# Patient Record
Sex: FEMALE | Race: BLACK OR AFRICAN AMERICAN
[De-identification: names, ages, dates, MRNs, and addresses within clinical notes are randomized per-mention and may not be internally consistent; named-entity substitution may affect disease eponyms.]

---

## 2017-10-10 ENCOUNTER — HOSPITAL ENCOUNTER (OUTPATIENT)
Dept: HOSPITAL 62 - WI | Age: 57
End: 2017-10-10
Attending: INTERNAL MEDICINE
Payer: COMMERCIAL

## 2017-10-10 DIAGNOSIS — Z12.31: Primary | ICD-10-CM

## 2017-10-10 PROCEDURE — 77067 SCR MAMMO BI INCL CAD: CPT

## 2017-10-10 PROCEDURE — G0202 SCR MAMMO BI INCL CAD: HCPCS

## 2017-10-10 NOTE — WOMENS IMAGING REPORT
EXAM DESCRIPTION:  BILAT SCREENING MAMMO W/CAD



COMPLETED DATE/TIME:  10/10/2017 9:12 am



REASON FOR STUDY:  SCREENING MAMMO Z12.31  ENCNTR SCREEN MAMMOGRAM FOR MALIGNANT NEOPLASM OF SAWYER



COMPARISON:  None.



TECHNIQUE:  Standard craniocaudal and mediolateral oblique views of each breast recorded using digita
l acquisition.



LIMITATIONS:  None.



FINDINGS:  No masses, calcifications or architectural distortion. No areas of suspicion.

Read with the assistance of CAD.

.[facility] - R2 Cenova Version 1.3

.[facility] - R2 Cenova Version 1.3

.[facility] - R2 Cenova Version 2.4

[place] [place] Cenova [place] 2.[place]

.[facility] - R2  Version 9.2



IMPRESSION:  NORMAL MAMMOGRAM.  BIRADS 1.



BREAST DENSITY:  b. There are scattered areas of fibroglandular density.



BIRAD:  1 NEGATIVE



RECOMMENDATION:  ROUTINE SCREENING



COMMENT:  The patient has been notified of the results by letter per SA requirements. Additional no
tification policies are in place for contacting patient with suspicious or incomplete findings.

Quality ID #225: The American College of Radiology recommends an annual screening mammogram for women
 aged 40 years or over. This facility utilizes a reminder system to ensure that all patients receive 
reminder letters, and/or direct phone calls for appointments. This includes reminders for routine scr
eening mammograms, diagnostic mammograms, or other Breast Imaging Interventions when appropriate.  Th
is patient will be placed in the appropriate reminder system.

The American College of Radiology (ACR) has developed recommendations for screening MRI of the breast
s in certain patient populations, to be used in conjunction with mammography.  Breast MRI surveillanc
e may be appropriate for women with more than 20% lifetime risk of developing breast cancer  as deter
mined by genetic testing, significant family history of the disease, or history of mantle radiation f
or Hodgkins Disease.  ACR Practice Guidelines 2008.



TECHNICAL DOCUMENTATION:  FINDING NUMBER: (1)

ASSESSMENT: (1)

JOB ID:  [id number]

 2011 [employer]- All Rights Reserved

## 2019-04-02 ENCOUNTER — HOSPITAL ENCOUNTER (OUTPATIENT)
Dept: HOSPITAL 62 - WI | Age: 59
End: 2019-04-02
Attending: OBSTETRICS & GYNECOLOGY
Payer: COMMERCIAL

## 2019-04-02 DIAGNOSIS — Z12.31: Primary | ICD-10-CM

## 2019-04-02 PROCEDURE — 77067 SCR MAMMO BI INCL CAD: CPT

## 2019-04-02 NOTE — WOMENS IMAGING REPORT
EXAM DESCRIPTION:  BILAT SCREENING MAMMO W/CAD



COMPLETED DATE/TIME:  4/2/2019 11:52 am



REASON FOR STUDY:  Z12.31 ROUTINE BILATERAL SCREENING Z12.31  ENCNTR SCREEN MAMMOGRAM FOR MALIGNANT N
EOPLASM OF SAWYER



COMPARISON:  10/10/2017.



TECHNIQUE:  Standard craniocaudal and mediolateral oblique views of each breast recorded using [employer]
l acquisition.



LIMITATIONS:  None.



FINDINGS:  No masses, calcifications or architectural distortion. No areas of suspicion.

Read with the assistance of CAD.

.[facility] - R2 Cenova Version 1.3

.[facility] - R2 Cenova Version 2.1

.[facility] - R2 Cenova Version 2.4

[place] [place]enova [place] 2.[place]

.[facility] - R2  Version 9.2



IMPRESSION:  NORMAL MAMMOGRAM.  BIRADS 1.



BREAST DENSITY:  b. There are scattered areas of fibroglandular density.



BIRAD:  1 NEGATIVE



RECOMMENDATION:  ROUTINE SCREENING



COMMENT:  The patient has been notified of the results by letter per SA requirements. Additional no
tification policies are in place for contacting patient with suspicious or incomplete findings.

Quality ID #225: The American College of Radiology recommends an annual screening mammogram for women
 aged 40 years or over. This facility utilizes a reminder system to ensure that all patients receive 
reminder letters, and/or direct phone calls for appointments. This includes reminders for routine scr
eening mammograms, diagnostic mammograms, or other Breast Imaging Interventions when appropriate.  Th
is patient will be placed in the appropriate reminder system.

The American College of Radiology (ACR) has developed recommendations for screening MRI of the breast
s in certain patient populations, to be used in conjunction with mammography.  Breast MRI surveillanc
e may be appropriate for women with more than 20% lifetime risk of developing breast cancer  as deter
mined by genetic testing, significant family history of the disease, or history of mantle radiation f
or Hodgkins Disease.  ACR Practice Guidelines 2008.



TECHNICAL DOCUMENTATION:  FINDING NUMBER: (1)

ASSESSMENT: (1)

JOB ID:  [id number]

 2011 [employer]- All Rights Reserved



Reading location - IP/workstation name: [email or web address]

## 2019-10-04 ENCOUNTER — HOSPITAL ENCOUNTER (EMERGENCY)
Dept: HOSPITAL 62 - ER | Age: 59
Discharge: HOME | End: 2019-10-04
Payer: COMMERCIAL

## 2019-10-04 VITALS — DIASTOLIC BLOOD PRESSURE: 72 MMHG | SYSTOLIC BLOOD PRESSURE: 144 MMHG

## 2019-10-04 DIAGNOSIS — K21.9: Primary | ICD-10-CM

## 2019-10-04 DIAGNOSIS — R11.0: ICD-10-CM

## 2019-10-04 DIAGNOSIS — R25.2: ICD-10-CM

## 2019-10-04 DIAGNOSIS — M54.6: ICD-10-CM

## 2019-10-04 DIAGNOSIS — M54.2: ICD-10-CM

## 2019-10-04 DIAGNOSIS — Q39.6: ICD-10-CM

## 2019-10-04 DIAGNOSIS — M54.9: ICD-10-CM

## 2019-10-04 DIAGNOSIS — I10: ICD-10-CM

## 2019-10-04 DIAGNOSIS — Z79.899: ICD-10-CM

## 2019-10-04 LAB
ADD MANUAL DIFF: NO
ALBUMIN SERPL-MCNC: 4.3 G/DL (ref 3.5–5)
ALP SERPL-CCNC: 84 U/L (ref 38–126)
ANION GAP SERPL CALC-SCNC: 10 MMOL/L (ref 5–19)
APTT BLD: 31.5 SEC (ref 23.5–35.8)
AST SERPL-CCNC: 20 U/L (ref 14–36)
BASOPHILS # BLD AUTO: 0 10^3/UL (ref 0–0.2)
BASOPHILS NFR BLD AUTO: 0.7 % (ref 0–2)
BILIRUB DIRECT SERPL-MCNC: 0.1 MG/DL (ref 0–0.4)
BILIRUB SERPL-MCNC: 0.7 MG/DL (ref 0.2–1.3)
BUN SERPL-MCNC: 19 MG/DL (ref 7–20)
CALCIUM: 9.4 MG/DL (ref 8.4–10.2)
CHLORIDE SERPL-SCNC: 99 MMOL/L (ref 98–107)
CK MB SERPL-MCNC: 0.41 NG/ML (ref ?–4.55)
CK SERPL-CCNC: 57 U/L (ref 30–135)
CO2 SERPL-SCNC: 31 MMOL/L (ref 22–30)
EOSINOPHIL # BLD AUTO: 0.1 10^3/UL (ref 0–0.6)
EOSINOPHIL NFR BLD AUTO: 1.2 % (ref 0–6)
ERYTHROCYTE [DISTWIDTH] IN BLOOD BY AUTOMATED COUNT: 13.5 % (ref 11.5–14)
GLUCOSE SERPL-MCNC: 129 MG/DL (ref 75–110)
HCT VFR BLD CALC: 40.2 % (ref 36–47)
HGB BLD-MCNC: 13.7 G/DL (ref 12–15.5)
INR PPP: 1.01
LYMPHOCYTES # BLD AUTO: 2.6 10^3/UL (ref 0.5–4.7)
LYMPHOCYTES NFR BLD AUTO: 47.6 % (ref 13–45)
MCH RBC QN AUTO: 30.3 PG (ref 27–33.4)
MCHC RBC AUTO-ENTMCNC: 34 G/DL (ref 32–36)
MCV RBC AUTO: 89 FL (ref 80–97)
MONOCYTES # BLD AUTO: 0.4 10^3/UL (ref 0.1–1.4)
MONOCYTES NFR BLD AUTO: 7 % (ref 3–13)
NEUTROPHILS # BLD AUTO: 2.4 10^3/UL (ref 1.7–8.2)
NEUTS SEG NFR BLD AUTO: 43.5 % (ref 42–78)
NT PRO BNP: 39 PG/ML (ref 5–900)
PLATELET # BLD: 341 10^3/UL (ref 150–450)
POTASSIUM SERPL-SCNC: 2.9 MMOL/L (ref 3.6–5)
PROT SERPL-MCNC: 8.3 G/DL (ref 6.3–8.2)
PROTHROMBIN TIME: 13.3 SEC (ref 11.4–15.4)
RBC # BLD AUTO: 4.51 10^6/UL (ref 3.72–5.28)
TOTAL CELLS COUNTED % (AUTO): 100 %
TROPONIN I SERPL-MCNC: < 0.012 NG/ML
WBC # BLD AUTO: 5.4 10^3/UL (ref 4–10.5)

## 2019-10-04 PROCEDURE — 85025 COMPLETE CBC W/AUTO DIFF WBC: CPT

## 2019-10-04 PROCEDURE — 80053 COMPREHEN METABOLIC PANEL: CPT

## 2019-10-04 PROCEDURE — 85730 THROMBOPLASTIN TIME PARTIAL: CPT

## 2019-10-04 PROCEDURE — 84443 ASSAY THYROID STIM HORMONE: CPT

## 2019-10-04 PROCEDURE — 82553 CREATINE MB FRACTION: CPT

## 2019-10-04 PROCEDURE — 36415 COLL VENOUS BLD VENIPUNCTURE: CPT

## 2019-10-04 PROCEDURE — 83880 ASSAY OF NATRIURETIC PEPTIDE: CPT

## 2019-10-04 PROCEDURE — 85610 PROTHROMBIN TIME: CPT

## 2019-10-04 PROCEDURE — 71275 CT ANGIOGRAPHY CHEST: CPT

## 2019-10-04 PROCEDURE — 84484 ASSAY OF TROPONIN QUANT: CPT

## 2019-10-04 PROCEDURE — 71046 X-RAY EXAM CHEST 2 VIEWS: CPT

## 2019-10-04 PROCEDURE — 93010 ELECTROCARDIOGRAM REPORT: CPT

## 2019-10-04 PROCEDURE — 83735 ASSAY OF MAGNESIUM: CPT

## 2019-10-04 PROCEDURE — 99284 EMERGENCY DEPT VISIT MOD MDM: CPT

## 2019-10-04 PROCEDURE — 93005 ELECTROCARDIOGRAM TRACING: CPT

## 2019-10-04 PROCEDURE — 82550 ASSAY OF CK (CPK): CPT

## 2019-10-04 NOTE — EKG REPORT
SEVERITY:- ABNORMAL ECG -

SINUS RHYTHM

INCOMPLETE RIGHT BUNDLE BRANCH BLOCK

:

Confirmed by: Layne Navarro MD 04-Oct-2019 23:18:31

## 2019-10-04 NOTE — ER DOCUMENT REPORT
ED Medical Screen (RME)





- General


Chief Complaint: Back Pain


Stated Complaint: BACK PAIN


Time Seen by Provider: 10/04/19 14:41


Primary Care Provider: 


DAVID WOODRUFF MD [Primary Care Provider] - Follow up as needed


Mode of Arrival: Ambulatory


Information source: Patient


Notes: 





59-year-old female presented to ED for complaint of back and neck pain since 

yesterday.  She states this morning she got up out of bed she became very cold 

and clammy broke out in a sweat and was very nauseated.  She states she has not 

had any injuries or anything it was causing her pain.  She does have a history 

of high blood pressure and GERD  Patient denies smoking or drugs but states she 

occasionally drinks.

















I have greeted and performed a rapid initial assessment of this patient.  A 

comprehensive ED assessment and evaluation of the patient, analysis of test 

results and completion of medical decision making process will be conducted by 

an additional ED providers.


TRAVEL OUTSIDE OF THE [place] IN LAST 30 DAYS: No





- Related Data


Allergies/Adverse Reactions: 


                                        





No Known Allergies Allergy (Verified 10/04/19 14:42)


   











Physical Exam





- Vital signs


Vitals: 





                                        











Temp Pulse Resp BP Pulse Ox


 


 99.1 F   77   18   150/73 H  96 


 


 10/04/19 14:06  10/04/19 14:06  10/04/19 14:06  10/04/19 14:06  10/04/19 14:06














Course





- Vital Signs


Vital signs: 





                                        











Temp Pulse Resp BP Pulse Ox


 


 99.1 F   77   18   150/73 H  96 


 


 10/04/19 14:06  10/04/19 14:06  10/04/19 14:06  10/04/19 14:06  10/04/19 14:06














Doctor's Discharge





- Discharge


Referrals: 


DAVID WOODRUFF MD [Primary Care Provider] - Follow up as needed

## 2019-10-04 NOTE — RADIOLOGY REPORT (SQ)
EXAM DESCRIPTION:  CHEST 2 VIEWS



COMPLETED DATE/TIME:  10/4/2019 4:43 pm



REASON FOR STUDY:  Back and shoulder pain with diaphoresis



COMPARISON:  None.



EXAM PARAMETERS:  NUMBER OF VIEWS: two views

TECHNIQUE: Digital Frontal and Lateral radiographic views of the chest acquired.

RADIATION DOSE: NA

LIMITATIONS: none



FINDINGS:  LUNGS AND PLEURA: No opacities, masses or pneumothorax. No pleural effusion.

MEDIASTINUM AND HILAR STRUCTURES: No masses or contour abnormalities.

HEART AND VASCULAR STRUCTURES: Heart normal size.  No evidence for failure.

BONES: No acute findings.

HARDWARE: None in the chest.

OTHER: No other significant finding.



IMPRESSION:  NO ACUTE RADIOGRAPHIC FINDING IN THE CHEST.



TECHNICAL DOCUMENTATION:  JOB ID:  [id number]

 2011 [employer]- All Rights Reserved



Reading location - IP/workstation name: [email or web address]

## 2019-10-04 NOTE — RADIOLOGY REPORT (SQ)
EXAM DESCRIPTION:  CTA CHEST



COMPLETED DATE/TIME:  10/4/2019 5:55 pm



REASON FOR STUDY:  Right posterior thoracic pain, R/o dissection othe



COMPARISON:  Two-view chest 10/4/2019



TECHNIQUE:  CT scan of the chest performed using helical scanning technique with dynamic intravenous 
contrast injection.  Images reviewed with lung, soft tissue and bone windows.  Reconstructed coronal 
and sagittal MPR images reviewed.

Additional 3 dimensional post-processing performed to develop Maximal Intensity Projection images (MI
P).  All images stored on PACS.

All CT scanners at this facility use dose modulation, iterative reconstruction, and/or weight based d
osing when appropriate to reduce radiation dose to as low as reasonably achievable (ALARA).

[facility]: Dose Right  [facility]: CareDose    [facility]: Dose Right    [facility]: Teradose 4D    [facility]: Smart Technologies



CONTRAST TYPE AND DOSE:  contrast/concentration: Isovue 350.00 mg/ml; Total Contrast Delivered: 67.0 
ml; Total Saline Delivered: 80.0 ml

Contrast bolus optimized for the pulmonary arteries and thoracic aorta.



RENAL FUNCTION:  Creatinine 0.8



RADIATION DOSE:  CT Rad equipment meets quality standard of care and radiation dose reduction techniq
ues were employed. CTDIvol: 13.2 - 16.4 mGy. DLP: 572 mGy-cm. .



LIMITATIONS:  None.



FINDINGS:  LUNGS AND PLEURA: No masses, infiltrates, or pneumothorax.  No pleural effusions or pleura
l calcifications.

AORTA AND GREAT VESSELS: No thoracic aortic aneurysm or dissection.

HEART: No pericardial effusion. No significant coronary artery calcifications.

PULMONARY ARTERIES: No emboli visualized in the main pulmonary arteries or the segmental branches.

HILAR AND MEDIASTINAL STRUCTURES: There is a small amount of radiopaque material in the distal esopha
louise and stomach fundus which could be a residua of a GI cocktail.  On axial images 79-83, a small tushar
unt of radiopaque material is present likely in a small distal esophageal diverticulum.  Small distal
 esophageal ulcer could not be excluded.

HARDWARE: None in the chest.

UPPER ABDOMEN: No significant findings.  Limited exam.

THYROID AND OTHER SOFT TISSUES: No masses.  No adenopathy.

BONES: No acute or significant finding.

3D MIPS: Confirm above findings.

OTHER: Findings discussed with Dr. Rodriguez in the emergency room.



IMPRESSION:  No CT angio evidence of acute pulmonary emboli or thoracic aortic dissection.



COMMENT:  Quality ID # 436: Final reports with documentation of one or more dose reduction techniques
 (e.g., Automated exposure control, adjustment of the mA and/or kV according to patient size, use of 
iterative reconstruction technique)



TECHNICAL DOCUMENTATION:  JOB ID:  [id number]

 2011 [employer]- All Rights Reserved



Reading location - IP/workstation name: [email or web address]

## 2019-10-04 NOTE — ER DOCUMENT REPORT
ED Neck/Back Problem





- General


Chief Complaint: Back Pain


Stated Complaint: BACK PAIN


Time Seen by Provider: 10/04/19 14:41


Primary Care Provider: 


DAVID WOODRUFF MD [Primary Care Provider] - Follow up in 3-5 days


Mode of Arrival: Ambulatory


Notes: 





Patient is complaining of pain in her right upper back, in the supra scapular 

region.  It radiates upward to the right cervical spine region.  She has 

absolutely no pain in her chest.  She said this started last night about 10 PM. 

She had gone to work and returned home when the symptoms began.  She has a 

rather sedentary work, mostly at a desk and without doing any kind of heavy 

lifting or other strenuous activity.  She had difficulty sleeping during the 

night last night.  She got up this morning and went to the bathroom and while 

sitting there on the commode, she began to get clammy and cold and sweaty.  She 

also got nauseated although she did not vomit.  Hypertension.   says the 

patient is under stress a lot.  Patient says that she was prescribed Protonix by

her primary care doctor about a couple weeks ago, but she has not been taking it

as prescribed.





Patient complains of cramping in her legs for a long time and she had that also 

at work yesterday.  Again, absolutely no anterior chest pains.


TRAVEL OUTSIDE OF THE [place] IN LAST 30 DAYS: No





- Related Data


Allergies/Adverse Reactions: 


                                        





No Known Allergies Allergy (Verified 10/04/19 14:42)


   











Past Medical History





- General


Information source: Patient





- Social History


Smoking Status: Unknown if Ever Smoked


Chew tobacco use (# tins/day): No


Frequency of alcohol use: Occasional


Drug Abuse: None


Family History: Reviewed & Not Pertinent


Patient has suicidal ideation: No


Patient has homicidal ideation: No





- Past Medical History


Cardiac Medical History: Reports: Hx Hypertension





Review of Systems





- Review of Systems


Notes: 





REVIEW OF SYSTEMS:





CONSTITUTIONAL :  Denies fever.


  


EENT:   Denies eye, ear, nose or mouth or throat pain or other symptoms.





CARDIOVASCULAR:  Denies chest pain.





RESPIRATORY:  Denies cough, chest congestion, or shortness of breath.





GASTROINTESTINAL:  Denies abdominal pain or nausea, vomiting, or diarrhea.





GENITOURINARY:  Denies difficulty or painful urinating, urinary frequency, blood

in urine.





MUSCULOSKELETAL: See HPI regarding back and neck pain.  Denies joint pain or 

swelling.





SKIN:   Denies rash or skin lesions.





NEUROLOGICAL:  Denies LOC or altered mental status.  Denies headache.  Denies 

sensory loss or motor deficits.





ALL OTHER SYSTEMS REVIEWED AND NEGATIVE.





Physical Exam





- Vital signs


Vitals: 


                                        











Temp Pulse Resp BP Pulse Ox


 


 99.1 F   77   18   150/73 H  96 


 


 10/04/19 14:06  10/04/19 14:06  10/04/19 14:06  10/04/19 14:06  10/04/19 14:06











Interpretation: Normal


Notes: 





PHYSICAL EXAMINATION:





GENERAL: Well-appearing, in no acute distress.





HEAD: Atraumatic, normocephalic.





EYES: Pupils equal round and reactive to light, extraocular movements intact.





ENT: oropharynx clear without exudates.  Moist mucous membranes.





NECK: Normal range of motion, supple.





LUNGS: Breath sounds clear and equal bilaterally.





HEART: Regular rate and rhythm without murmurs.





ABDOMEN: Soft, nontender.  No guarding or rebound.  No masses.





BACK: Patient has pain in the right suprascapular area, although there is not 

really any significant point tenderness to my exam in that area.  Otherwise, no 

tenderness throughout entire back.





EXTREMITIES: Normal range of motion without pain.





NEUROLOGICAL:  Normal speech, normal gait.  Normal sensory, motor, and reflex 

exams.  Awake, alert, and oriented x3.  Cranial nerves normal.





PSYCH: Normal mood, normal affect.





SKIN: Warm, dry, no rashes.





Course





- Re-evaluation


Re-evalutation: 





10/04/19 19:17


At time of discharge, I recommended and wrote her a prescription for potassium 

10 mg twice a day for the next 6 days.  Recommended that the patient see her 

primary care provider to have that rechecked later in the week.


Patient says she has a prescription for Protonix at home which her doctor wrote 

for her but she has not been taking it.








- Vital Signs


Vital signs: 


                                        











Temp Pulse Resp BP Pulse Ox


 


 98.5 F   72   15   144/72 H  100 


 


 10/04/19 18:59  10/04/19 18:59  10/04/19 18:59  10/04/19 18:59  10/04/19 18:59














- Laboratory


Result Diagrams: 


                                 10/04/19 15:09





                                 10/04/19 15:09


Laboratory results interpreted by me: 


                                        











  10/04/19 10/04/19





  15:09 15:09


 


Lymph % (Auto)  47.6 H 


 


Potassium   2.9 L*


 


Carbon Dioxide   31 H


 


Glucose   129 H


 


Total Protein   8.3 H














- Diagnostic Test


Radiology reviewed: Image reviewed, Reports reviewed - CTA of the aorta shows no

evidence of a dissection or aneurysms.  Radiologist did find radiopaque 

substance of the distal esophagus and an esophageal diverticulum and likely acid

reflux.  Chest x-ray was essentially unremarkable.





- EKG Interpretation by Me


EKG shows normal: Sinus rhythm


Rate: Normal


Axis/QRS: RBBB - Incomplete





Discharge





- Discharge


Clinical Impression: 


 Thoracic back pain, Esophageal diverticulum, Acid reflux disease





Condition: Stable


Disposition: HOME, SELF-CARE


Additional Instructions: 


Reflux Disease (GERD)





     Gastro-Esophageal Reflux Disease (GERD) is caused by stomach acid refluxing

back up into the esophagus. The valve at the end of the esophagus may be weak. 

This is common in persons with a hiatal hernia. GERD symptoms can include 

indigestion, chest pain, heartburn, or food "sticking." Certain foods, alcohol, 

and aspirin can make GERD worse.


     Treatment depends on the severity. Usually, antacids or acid-suppressing 

medicines are used. When the esophagus is acutely inflamed, the physician will 

often prescribe membrane-protective drugs such as Carafate.  Some patients 

benefit from medication such as Reglan that tightens the valve at the top of the

stomach.


     Avoid those foods that bring on your symptoms. For many people, these foods

are coffee, chocolate, onions, garlic, and carbonated drinks. Don't use alcohol,

aspirin, caffeine, or tobacco. Don't eat late at night -- within 4 hours of 

bedtime. Don't over-eat. If necessary, elevate the head of your bed about 4 

inches so that stomach acid will not roll up into your esophagus.


     Call the doctor if you develop severe chest pain, inability to swallow 

fluids, fever, or worsening symptoms.








The remaining studies that were done here in the emergency department are all 

essentially normal.





You have a clinically insignificant finding of an incomplete right bundle branch

block and your heart.  I explained to you that this is a common finding and not 

for you to be concerned about at this time.  There is no evidence of your having

had a heart attack at this time.





Your CT scan shows that you have an esophageal diverticulum and some other 

changes that suggest acid reflux disease.








NORMAL EXAM AND WORKUP:


     At this time, your examination and workup show no other significant 

abnormality.  No significant abnormal physical findings are noted.  All 

laboratory, EKG, and imaging (x-ray, CT scans, ultrasound) studies that were 

ordered show no significant abnormality.


     Although your examination and all studies that were ordered showed no 

significant abnormal finding, there are no examinations and no studies that are 

100% accurate.  There is always the possibility that some abnormality could 

exist and not be detected with physical examination or within the limits and 

capabilities of laboratory and other studies.


     You should return or follow up as you were instructed on your visit today 

for further evaluation if your symptoms do not resolve.








ACID REFLUX DISEASE (GERD):


     Gastro-Esophageal Reflux Disease (GERD) is caused by stomach acid refluxing

back up into the esophagus. The valve at the end of the esophagus may be weak. 

This is common in persons with a hiatal hernia. GERD symptoms can include 

indigestion, chest pain, heartburn, or food "sticking." Certain foods, alcohol, 

and aspirin can make GERD worse.


     Treatment depends on the severity. Usually, antacids or acid-suppressing 

medicines are used. When the esophagus is acutely inflamed, the physician will 

often prescribe membrane-protective drugs such as Carafate.  Some patients 

benefit from medication such as Reglan that tightens the valve at the top of the

stomach.


     Avoid those foods that bring on your symptoms. For many people, these foods

are coffee, chocolate, onions, garlic, and carbonated drinks. Don't use alcohol,

aspirin, caffeine, or tobacco. Don't eat late at night -- within 4 hours of 

bedtime. Don't over-eat. If necessary, elevate the head of your bed about 4 

inches so that stomach acid will not roll up into your esophagus.


     Call the doctor if you develop severe chest pain, inability to swallow flu

ids, fever, or worsening symptoms.











ANTACID THERAPY:


     You have been instructed to start antacid therapy.  Antacids directly 

neutralize stomach acid.  This is useful for acid irritation of the esophagus, 

gastritis, and ulcers.


     You should take two tablespoons of antacid one hour after each meal and 

three hours after each meal.  If you are not eating, take the antacid every two 

hours.  If you are using a concentrate (such as Maalox TC), use only one 

tablespoon.


     Many antacids affect the bowels.  The most common problem is diarrhea.  In 

this case, a pure aluminum hydroxide antacid (such as AlternaGel) can be 

substituted for some or all doses.  If the problem is constipation, add a 

teaspoon of Milk of Magnesia to each dose.


     Call the doctor if you experience continued diarrhea or constipation, or if

you develop lightheadedness, bloody stool or vomitus, severe abdominal pain, or 

black stool.





Protonix or


PRILOSEC (ACID PUMP INHIBITOR): 


     Prilosec (omeprazole) is an acid-pump inhibitor.  It blocks the secretion 

of hydrogen ions in the acid-producing cells of the stomach. Prilosec keeps your

stomach from making acid.


     Take all medication as prescribed, even after the pain is gone. Regular 

antacids may be added as needed if you have symptoms while taking this medicine.


     There are usually no side effects from this medication.  Contact your 

doctor if there is fever, rash, yellow skin color, increasing abdominal pain, 

weakness, or unusual bruising.


     Return at once if you develop lightheadedness, black or bloody stool, or 

bloody vomitus.








Hypokalemia





     You have an abnormally decreased level of serum potassium.  Hypokalemia may

cause weakness, fatigue, or heart rhythm abnormalities. Sometimes there are no 

symptoms at all.  Usually, low serum potassium is due to taking diuretics (water

pills).  It can also be due to excessive vomiting or diarrhea.  If no obvious 

cause is evident, further evaluation will be necessary.


     Treatment is usually oral potassium supplements.  Take these exactly as 

prescribed.  You may also want to select foods which are naturally high in 

potassium -- fruits (such as bananas, cantaloupe, grapes, oranges, prunes, 

tomatoes), fresh vegetables (potatoes, spinach, beans, peas), orange or tomato 

juice, tomato pasta sauce, milk, fish (halibut, tuna, salmon, diallo)


     A follow-up blood test is usually performed to assure that the potassium is

returning to normal.


     Call the physician if you suffer severe weakness, muscle twitching or 

cramping, palpitations (pounding or irregular heartbeat), or any other new or 

alarming symptoms.





FOLLOW-UP CARE:


If you have been referred to a physician for follow-up care, call the 

physicians office for an appointment as you were instructed or within the next 

two days.  If you experience worsening or a significant change in your symptoms,

notify the physician immediately or return to the Emergency Department at any 

time for re-evaluation.





See your doctor the first of next week to have them recheck your potassium level

and also regulate your medications for this apparent acid reflux that you are 

experiencing.  At any time, return for us to reevaluate your condition if you 

have concerns about new or worsening symptoms.








Prescriptions: 


Potassium Chloride 10 meq PO BID #12 capsule.er


Referrals: 


DAVID WOODRUFF MD [Primary Care Provider] - Follow up in 3-5 days

## 2020-07-01 ENCOUNTER — HOSPITAL ENCOUNTER (OUTPATIENT)
Dept: HOSPITAL 62 - WI | Age: 60
End: 2020-07-01
Payer: COMMERCIAL

## 2020-07-01 DIAGNOSIS — Z12.31: Primary | ICD-10-CM

## 2020-07-01 PROCEDURE — 77063 BREAST TOMOSYNTHESIS BI: CPT

## 2020-07-01 PROCEDURE — 77067 SCR MAMMO BI INCL CAD: CPT

## 2020-07-01 NOTE — WOMENS IMAGING REPORT
EXAM DESCRIPTION:  3D SCREENING MAMMO BILAT



IMAGES COMPLETED DATE/TIME:  7/1/2020 3:28 pm



REASON FOR STUDY:  Z12.31 ENCOUNTER FOR SCREENING MAMMOGRAM FOR MALIGNANT NEOPLASM OF BREAST Z12.31  
ENCNTR SCREEN MAMMOGRAM FOR MALIGNANT NEOPLASM OF SAWYER



COMPARISON:  2019, 2017



EXAM PARAMETERS:  Views: Standard craniocaudal and mediolateral oblique views of each breast recorded
 using digital acquisition and breast tomosynthesis.

Read with the assistance of CAD.

.[facility] - [employer]  Version 9.2



LIMITATIONS:  None.



FINDINGS:  No suspicious masses, suspicious calcifications or architectural distortion. No areas of c
oncern.



IMPRESSION:   NEGATIVE MAMMOGRAM. BIRADS 1.



BREAST DENSITY:  a. The breasts are almost entirely fatty.



BIRAD:  ASSESSMENT:  1 NEGATIVE



RECOMMENDATION:  ROUTINE SCREENING

Please continue yearly bilateral screening mammography/tomosynthesis in July 2021



COMMENT:  The patient has been notified of the results by letter per MQSA requirements. Additional no
tification policies are in place for contacting patient with suspicious or incomplete findings.

Quality ID #225: The [employer] recommends an annual screening mammogram for women
 aged 40 years or over. This facility utilizes a reminder system to ensure that all patients receive 
reminder letters, and/or direct phone calls for appointments. This includes reminders for routine scr
eening mammograms, diagnostic mammograms, or other Breast Imaging Interventions when appropriate.  Th
is patient will be placed in the appropriate reminder system.



TECHNICAL DOCUMENTATION:  FINDING NUMBER: (1)

ASSESSMENT:  (1)

JOB ID:  [id number]

 2011 [employer]- All Rights Reserved



Reading location - IP/workstation name: [email or web address]